# Patient Record
Sex: MALE | HISPANIC OR LATINO | URBAN - NONMETROPOLITAN AREA
[De-identification: names, ages, dates, MRNs, and addresses within clinical notes are randomized per-mention and may not be internally consistent; named-entity substitution may affect disease eponyms.]

---

## 2018-06-12 ENCOUNTER — APPOINTMENT (RX ONLY)
Dept: URBAN - NONMETROPOLITAN AREA CLINIC 1 | Facility: CLINIC | Age: 28
Setting detail: DERMATOLOGY
End: 2018-06-12

## 2018-06-12 DIAGNOSIS — B37.2 CANDIDIASIS OF SKIN AND NAIL: ICD-10-CM

## 2018-06-12 DIAGNOSIS — B35.3 TINEA PEDIS: ICD-10-CM

## 2018-06-12 DIAGNOSIS — B35.1 TINEA UNGUIUM: ICD-10-CM

## 2018-06-12 PROBLEM — L70.0 ACNE VULGARIS: Status: ACTIVE | Noted: 2018-06-12

## 2018-06-12 PROBLEM — D23.39 OTHER BENIGN NEOPLASM OF SKIN OF OTHER PARTS OF FACE: Status: ACTIVE | Noted: 2018-06-12

## 2018-06-12 PROCEDURE — 99203 OFFICE O/P NEW LOW 30 MIN: CPT

## 2018-06-12 PROCEDURE — ? TREATMENT REGIMEN

## 2018-06-12 PROCEDURE — ? COUNSELING

## 2018-06-12 PROCEDURE — ? PRESCRIPTION

## 2018-06-12 RX ORDER — OXICONAZOLE NITRATE 10 MG/G
CREAM TOPICAL
Qty: 1 | Refills: 3 | Status: ERX | COMMUNITY
Start: 2018-06-12

## 2018-06-12 RX ADMIN — OXICONAZOLE NITRATE: 10 CREAM TOPICAL at 15:32

## 2018-06-12 ASSESSMENT — LOCATION DETAILED DESCRIPTION DERM
LOCATION DETAILED: 1ST WEBSPACE LEFT FOOT
LOCATION DETAILED: LEFT INSTEP

## 2018-06-12 ASSESSMENT — LOCATION ZONE DERM
LOCATION ZONE: FEET
LOCATION ZONE: FEET

## 2018-06-12 ASSESSMENT — LOCATION SIMPLE DESCRIPTION DERM
LOCATION SIMPLE: LEFT FOOT
LOCATION SIMPLE: LEFT PLANTAR SURFACE

## 2019-05-31 ENCOUNTER — APPOINTMENT (RX ONLY)
Dept: URBAN - NONMETROPOLITAN AREA CLINIC 1 | Facility: CLINIC | Age: 29
Setting detail: DERMATOLOGY
End: 2019-05-31

## 2019-05-31 DIAGNOSIS — Z41.9 ENCOUNTER FOR PROCEDURE FOR PURPOSES OTHER THAN REMEDYING HEALTH STATE, UNSPECIFIED: ICD-10-CM

## 2019-05-31 PROCEDURE — ? MICRONEEDLING

## 2019-05-31 NOTE — PROCEDURE: MICRONEEDLING
Location #1: Forehead
Speed (Location #2): high
Consent: Written consent reviewed by RN and signed by pt.  Risks reviewed including but not limited to pain, swelling, scarring, infection, and incomplete improvement. \\Jose Alejandro pt concerns and questions addressed and pt verbalized understanding.
Depth In Mm (Location #3): 0.75
Location #3: nose
Depth In Mm (Location #2): 2.25
Topical Anesthesia?: 23% lidocaine, 7% tetracaine
Post-Care Instructions: Detailed post care reviewed and pt provided written post care instructions including a detailed list of products to avoid for the first 24 hours post tx.  After the initial 24 hour period, pt should use high quality, gentle moisturizer, and protect the treatment area with sunscreen.  Pt educated to continue use of high quality products and avoid retinoid type products until the skin is fully healed at which time they can return to their normal home care routine.  \\nPt verbalized understanding.\\n\\nComplimentary service for training and practice purposes.
Depth In Mm (Location #4): 1.5
Treatment Number (Optional): 1
Location #2: Cheeks
Location #4: Chin
Depth In Mm (Location #5): 2
Location #5: chest
Detail Level: Zone
Length Of Topical Anesthesia Application (Optional): 45 minutes

## 2019-06-07 ENCOUNTER — APPOINTMENT (RX ONLY)
Dept: URBAN - NONMETROPOLITAN AREA CLINIC 1 | Facility: CLINIC | Age: 29
Setting detail: DERMATOLOGY
End: 2019-06-07

## 2019-06-07 DIAGNOSIS — Z41.9 ENCOUNTER FOR PROCEDURE FOR PURPOSES OTHER THAN REMEDYING HEALTH STATE, UNSPECIFIED: ICD-10-CM

## 2019-06-07 PROCEDURE — ? COSMETIC FOLLOW-UP

## 2019-06-07 NOTE — PROCEDURE: COSMETIC FOLLOW-UP
Comments (Free Text): 48 hrs redness, peeling 5 days
Detail Level: Zone
Treatment (Optional): Microneedling
Price (Use Numbers Only, No Special Characters Or $): 0
Patient Satisfaction: Very pleased
Global Improvement: Good

## 2019-07-11 ENCOUNTER — APPOINTMENT (RX ONLY)
Dept: URBAN - NONMETROPOLITAN AREA CLINIC 1 | Facility: CLINIC | Age: 29
Setting detail: DERMATOLOGY
End: 2019-07-11

## 2019-07-11 DIAGNOSIS — Z41.9 ENCOUNTER FOR PROCEDURE FOR PURPOSES OTHER THAN REMEDYING HEALTH STATE, UNSPECIFIED: ICD-10-CM

## 2019-07-11 PROCEDURE — ? MICRONEEDLING

## 2019-07-11 NOTE — PROCEDURE: MICRONEEDLING
Location #1: Forehead
Treatment Number (Optional): 2
Speed (Location #1): high
Length Of Topical Anesthesia Application (Optional): 15 minutes
Depth In Mm (Location #4): 1.5
Location #4: Chin
Location #5: chest
Location #2: Cheeks
Consent: Written consent reviewed by RN and signed by pt.  Risks reviewed including but not limited to pain, swelling, scarring, infection, and incomplete improvement. \\Jose Alejandro pt concerns and questions addressed and pt verbalized understanding.
Depth In Mm (Location #1): 1
Location #3: nose
Detail Level: Zone
Depth In Mm (Location #3): 0.75
Topical Anesthesia?: 23% lidocaine, 7% tetracaine
Post-Care Instructions: Detailed post care reviewed and pt provided written post care instructions including a detailed list of products to avoid for the first 24 hours post tx.  After the initial 24 hour period, pt should use high quality, gentle moisturizer, and protect the treatment area with sunscreen.  Pt educated to continue use of high quality products and avoid retinoid type products until the skin is fully healed at which time they can return to their normal home care routine.  \\nPt verbalized understanding.\\n\\nComplimentary service for training purposes.

## 2019-08-01 ENCOUNTER — APPOINTMENT (RX ONLY)
Dept: URBAN - NONMETROPOLITAN AREA CLINIC 1 | Facility: CLINIC | Age: 29
Setting detail: DERMATOLOGY
End: 2019-08-01

## 2019-08-01 DIAGNOSIS — Z41.9 ENCOUNTER FOR PROCEDURE FOR PURPOSES OTHER THAN REMEDYING HEALTH STATE, UNSPECIFIED: ICD-10-CM

## 2019-08-01 PROCEDURE — ? CHEMICAL PEEL

## 2019-08-01 PROCEDURE — ? PRESCRIPTION

## 2019-08-01 RX ORDER — HYDROQUINONE 4 %
CREAM (GRAM) TOPICAL
Qty: 1 | Refills: 3 | COMMUNITY
Start: 2019-08-01

## 2019-08-01 RX ADMIN — Medication: at 17:32

## 2019-08-01 ASSESSMENT — LOCATION ZONE DERM: LOCATION ZONE: FACE

## 2019-08-01 ASSESSMENT — LOCATION SIMPLE DESCRIPTION DERM: LOCATION SIMPLE: GLABELLA

## 2019-08-01 ASSESSMENT — LOCATION DETAILED DESCRIPTION DERM: LOCATION DETAILED: GLABELLA

## 2019-08-01 NOTE — PROCEDURE: CHEMICAL PEEL
Chemical Peel: %
Post Peel Care: After the procedure, Alastin soothe and protect balm and PCA weightless SPF 45 applied. \\nPt instructed to apply hydrocortisone 1% OTC cream to treated areas while healing.  Pt may use Alastin moisturizer BID or more frequently as needed while skin flaking occurs and he protect from the sun with Leela sunscreen. Reapplication of sunscreen if exposed to sun for more than 1.5 hours emphasized. Pt given an Rx for 4% HQ and instructed to apply all over the treat,ent area once the skin has healed. Pt verbalized understanding.
Consent: Prior to the procedure, photos were taken, written consent was obtained, and RN reviewed the risks with the pt, including but not limited to: redness, peeling, blistering, pigmentary change, scarring, infection, and pain.  All pts questions and concerns addressed and pt verbalized understanding.
Treatment Time (Optional): 30
Number Of Layers: 2
Detail Level: Zone
Comments: Treatment provided complimentary for testing purposes as an adjunct to microneedling.
Post-Care Instructions: I reviewed with the patient in detail post-care instructions. Pt provided with samples of PCA creamy cleanser, rebalance and weightless SPF for home care. Patient should avoid sun exposure and wear sun protection.
Prep: The treated area was cleansed with PCA oily/problem face wash and degreased with alcohol. 100% TCA diluted with equal parts distilled water for final strength of 50% applied with wooden end of cotton tipped applicator into base of ice pick acne scars until frosting was observed. TCA removed with oily/problem facial wash and warm towel.
Treatment Number: 1

## 2019-08-15 ENCOUNTER — APPOINTMENT (RX ONLY)
Dept: URBAN - NONMETROPOLITAN AREA CLINIC 1 | Facility: CLINIC | Age: 29
Setting detail: DERMATOLOGY
End: 2019-08-15

## 2019-08-15 DIAGNOSIS — L90.5 SCAR CONDITIONS AND FIBROSIS OF SKIN: ICD-10-CM

## 2019-08-15 DIAGNOSIS — Z41.9 ENCOUNTER FOR PROCEDURE FOR PURPOSES OTHER THAN REMEDYING HEALTH STATE, UNSPECIFIED: ICD-10-CM

## 2019-08-15 PROCEDURE — ? ADDITIONAL NOTES

## 2019-08-15 PROCEDURE — ? MICRONEEDLING

## 2019-08-15 PROCEDURE — ? COSMETIC FOLLOW-UP

## 2019-08-15 NOTE — PROCEDURE: ADDITIONAL NOTES
Detail Level: Simple
Additional Notes: Skin Pen Microneedling Treatment\\nPre and Post Procedure Instructions\\nPre-Procedure\\nFor best results and minimal downtime, it is recommended that the patient pre-treat their skin twice per day for up to two weeks with Alastin Skin Regenerating Nectar.  The patient will continue to use the Nectar for up to two weeks post treatment for best results.  \\nPrior to procedure precautions: \\n• Avoid excessive sun exposure/sunburns 24 hours prior to procedure\\n• Discontinue use of topical retinoids 72 hours prior to procedure\\n• Allow at least 24 hours after autoimmune therapies before Skin Pen treatment\\n• Wait 6 months following oral isotretinoin use\\n• If you experience cold sores please inform your practitioner; we can provide you with a prescription anti-viral medication to suppress the possibility of an outbreak\\nDuring the Procedure\\n1. The patient’s skin will be prepped with topical numbing agent. \\n2. Patient’s skin will be cleansed and a glide product applied to the skin for treatment. \\n3. Treatment with the microneedling device will take approximately 30 to 45 minutes. \\n4. Alastin Skin Regenerating Nectar will be applied to the skin post treatment. \\nPost Procedure Instructions and Expectations\\n It is important the patient understands and follows the post procedure instructions.  Microneedling creates “micro channels” where the tiny needle has entered and exited the skin creating an injury.  The micro-channels remain open for the first 24 hours post treatment.  During this 24 hour period, it of the upmost importance the patient applies the correct skin care to avoid complications.  If you wish, you may bring your products into the office so we may determine if they are safe for use.\\nListed below are product ingredients which the patient SHOULD NOT APPLY to their skin while the micro-channels are open:\\nProducts to AVOID for the first 24 hours post treatment: \\n• Silicone\\n• Dimethicone\\n• Methicone\\n• Dyes\\n• Fragrance\\n• Petrolatum or Petroleum \\n• Glycol or propylene glycol\\n• Mineral oil\\n• Sulphates, such as Sodium Jackeline Sulphates or derivatives\\n• Parabens\\n• Citrus-derived preservatives\\n• Formaldehyde releasing preservatives\\n \\nProducts to consider for optimal results post treatment and are APPROVED for the application in the first 24 hours: \\n• Vitamin C\\n• Vitamin E\\n• Peptides\\n• Hyaluronic Acid\\n• Magnesium\\n• Growth Factors\\nPost Procedure Home Care\\nThe First 24 hours Post Treatment\\n1. WASH with a gentle cleanser and water, morning and night, lightly pat skin dry. \\n2. APPLY only Alastin [place] or other approved skin care for the first 24 hours post procedure. \\n3. MOISTURIZE with a soothing balm or appropriate moisturizer after the Nectar product.  \\na. You may reapply the moisturizer or soothing balm multiple times per day to soothe irritation and promote moisture in the skin. \\n4. AVOID sun for the first 24 hours post treatment.  \\n5. AVOID Strenuous exercise or excessive perspiration 24-48 hours post treatment.  \\na. Excessive blood flow and sweat can cause irritation and discomfort to the treatment area. \\n48 Hours to 2 Weeks Post Treatment\\n1. WASH with a gentle cleanser and water, morning and night, lightly pat skin dry. \\n2. APPLY Alastin [place], morning and night, for up to 2 weeks post treatment or until the skin has fully healed. \\n3. MOISTURIZE with a soothing balm or appropriate moisturizer after the Nectar product.  \\na. You may reapply the moisturizer or soothing balm multiple times per day to soothe irritation and promote moisture in the skin. \\nb. AVOID Home care corrective products, such as Citric Acid, Retinols/Retinoids, Alpha Hydroxy Acids, Beta Hydroxy Acids, scrubs, exfoliating products, cleansing brushes (such as the Clarisonic Brush), or other products considered to have active ingredients should be avoided until the skin is fully healed. \\n4. PROTECT your investment by applying a high quality, physical sunscreen.  \\na. Chemical sunscreens of any kind are not recommended until the skin has fully healed.  \\nb. If sun exposure cannot be avoided, assure you are reapplying the physical sunscreen every 90 minutes and protect the treatment area with clothing (a wide-brimmed hat, a buff, etc.). \\n5. RESUME your normal skin care routine once the skin has fully healed and discontinue the use of the Alastin Skin Regenerating Nectar product. \\nIf you have any questions or concerns about your treatment please contact the [facility] office at \\[phone].  Thank you!

## 2019-08-15 NOTE — PROCEDURE: MICRONEEDLING
Speed (Location #4): high
Location #2: Forehead
Treatment Number (Optional): 3
Depth In Mm (Location #3): 1
Location #4: Under eye
Depth In Mm (Location #5): 1.75
Location #1: Cheeks
Location #5: chest
Depth In Mm (Location #1): 2
Post-Care Instructions: Detailed post care reviewed and pt provided written post care instructions including a detailed list of products to avoid for the first 24 hours post tx.  After the initial 24 hour period, pt should use high quality, gentle moisturizer, and protect the treatment area with sunscreen.  Pt educated to continue use of high quality products and avoid retinoid type products until the skin is fully healed at which time they can return to their normal home care routine.  \\nPt verbalized understanding.\\n\\nLast tx  in a series of 3 for training purposes.
Depth In Mm (Location #4): 0.5
Consent: Written consent reviewed by RN and signed by pt.  Risks reviewed including but not limited to pain, swelling, scarring, infection, and incomplete improvement. \\Jose Alejandro pt concerns and questions addressed and pt verbalized understanding.
Length Of Topical Anesthesia Application (Optional): 15 minutes
Location #3: Nose/Chin
Detail Level: Zone
Topical Anesthesia?: 23% lidocaine, 7% tetracaine

## 2025-05-28 NOTE — PROCEDURE: COSMETIC FOLLOW-UP
Dr. Sewell    I spoke to Estelle.  I reviewed the procedure result note with her over the phone and I reviewed what you said below.  She is available this afternoon/evening by phone, I let her know you wanted to speak to her per your message below.    Thank you  
Global Improvement: Good
Patient Satisfaction: Pleased
Treatment Override (Free Text): 50% TCA acne scar tx
Side Effects Override (Free Text): scabbing and flaking approx. 10 days
Detail Level: Zone
Price (Use Numbers Only, No Special Characters Or $): 0
Comments (Free Text): 2 weeks post 50% TCA peel applied to base of acne scars with scabbing and crusting for 10 days. We will perform the final [place] tx in a series of 3 for training purposes. Pt will return in one month for final comparison of before and and after photos.